# Patient Record
Sex: FEMALE | Race: WHITE | Employment: OTHER | URBAN - NONMETROPOLITAN AREA
[De-identification: names, ages, dates, MRNs, and addresses within clinical notes are randomized per-mention and may not be internally consistent; named-entity substitution may affect disease eponyms.]

---

## 2018-01-01 ENCOUNTER — APPOINTMENT (OUTPATIENT)
Dept: GENERAL RADIOLOGY | Age: 83
DRG: 283 | End: 2018-01-01
Payer: MEDICARE

## 2018-01-01 ENCOUNTER — HOSPITAL ENCOUNTER (INPATIENT)
Age: 83
LOS: 5 days | DRG: 283 | End: 2018-03-14
Attending: INTERNAL MEDICINE | Admitting: INTERNAL MEDICINE
Payer: MEDICARE

## 2018-01-01 VITALS
DIASTOLIC BLOOD PRESSURE: 61 MMHG | RESPIRATION RATE: 16 BRPM | HEART RATE: 73 BPM | BODY MASS INDEX: 22.78 KG/M2 | SYSTOLIC BLOOD PRESSURE: 100 MMHG | WEIGHT: 116 LBS | OXYGEN SATURATION: 97 % | HEIGHT: 60 IN | TEMPERATURE: 97.4 F

## 2018-01-01 DIAGNOSIS — R77.8 ELEVATED TROPONIN: ICD-10-CM

## 2018-01-01 DIAGNOSIS — R62.51 FAILURE TO THRIVE (0-17): Primary | ICD-10-CM

## 2018-01-01 LAB
ALBUMIN SERPL-MCNC: 3.1 G/DL (ref 3.5–5.1)
ALBUMIN SERPL-MCNC: 3.7 G/DL (ref 3.5–5.1)
ALLEN TEST: POSITIVE
ALLEN TEST: POSITIVE
ALP BLD-CCNC: 80 U/L (ref 38–126)
ALP BLD-CCNC: 93 U/L (ref 38–126)
ALT SERPL-CCNC: 25 U/L (ref 11–66)
ALT SERPL-CCNC: 32 U/L (ref 11–66)
AMORPHOUS: ABNORMAL
ANION GAP SERPL CALCULATED.3IONS-SCNC: 13 MEQ/L (ref 8–16)
ANION GAP SERPL CALCULATED.3IONS-SCNC: 14 MEQ/L (ref 8–16)
ANION GAP SERPL CALCULATED.3IONS-SCNC: 16 MEQ/L (ref 8–16)
ANION GAP SERPL CALCULATED.3IONS-SCNC: 17 MEQ/L (ref 8–16)
ANION GAP SERPL CALCULATED.3IONS-SCNC: 19 MEQ/L (ref 8–16)
ANION GAP SERPL CALCULATED.3IONS-SCNC: 21 MEQ/L (ref 8–16)
ANISOCYTOSIS: ABNORMAL
ANISOCYTOSIS: ABNORMAL
AST SERPL-CCNC: 27 U/L (ref 5–40)
AST SERPL-CCNC: 30 U/L (ref 5–40)
BACTERIA: ABNORMAL /HPF
BASE EXCESS (CALCULATED): -14.9 MMOL/L (ref -2.5–2.5)
BASE EXCESS (CALCULATED): -2.8 MMOL/L (ref -2.5–2.5)
BASOPHILS # BLD: 0.1 %
BASOPHILS # BLD: 1.8 %
BASOPHILS ABSOLUTE: 0 THOU/MM3 (ref 0–0.1)
BASOPHILS ABSOLUTE: 0.2 THOU/MM3 (ref 0–0.1)
BILIRUB SERPL-MCNC: 1.1 MG/DL (ref 0.3–1.2)
BILIRUB SERPL-MCNC: 1.2 MG/DL (ref 0.3–1.2)
BILIRUBIN DIRECT: 0.5 MG/DL (ref 0–0.3)
BILIRUBIN URINE: ABNORMAL
BLOOD, URINE: ABNORMAL
BUN BLDV-MCNC: 28 MG/DL (ref 7–22)
BUN BLDV-MCNC: 30 MG/DL (ref 7–22)
BUN BLDV-MCNC: 35 MG/DL (ref 7–22)
BUN BLDV-MCNC: 44 MG/DL (ref 7–22)
BUN BLDV-MCNC: 56 MG/DL (ref 7–22)
BUN BLDV-MCNC: 70 MG/DL (ref 7–22)
CALCIUM SERPL-MCNC: 8.7 MG/DL (ref 8.5–10.5)
CALCIUM SERPL-MCNC: 8.7 MG/DL (ref 8.5–10.5)
CALCIUM SERPL-MCNC: 8.9 MG/DL (ref 8.5–10.5)
CALCIUM SERPL-MCNC: 9 MG/DL (ref 8.5–10.5)
CALCIUM SERPL-MCNC: 9.1 MG/DL (ref 8.5–10.5)
CALCIUM SERPL-MCNC: 9.9 MG/DL (ref 8.5–10.5)
CASTS UA: ABNORMAL /LPF
CHARACTER, URINE: ABNORMAL
CHLORIDE BLD-SCNC: 92 MEQ/L (ref 98–111)
CHLORIDE BLD-SCNC: 92 MEQ/L (ref 98–111)
CHLORIDE BLD-SCNC: 93 MEQ/L (ref 98–111)
CHLORIDE BLD-SCNC: 94 MEQ/L (ref 98–111)
CHLORIDE BLD-SCNC: 95 MEQ/L (ref 98–111)
CHLORIDE BLD-SCNC: 96 MEQ/L (ref 98–111)
CHOLESTEROL, TOTAL: 194 MG/DL (ref 100–199)
CO2: 21 MEQ/L (ref 23–33)
CO2: 23 MEQ/L (ref 23–33)
CO2: 24 MEQ/L (ref 23–33)
CO2: 25 MEQ/L (ref 23–33)
CO2: 26 MEQ/L (ref 23–33)
CO2: 26 MEQ/L (ref 23–33)
COLLECTED BY:: ABNORMAL
COLLECTED BY:: ABNORMAL
COLOR: ABNORMAL
CREAT SERPL-MCNC: 0.6 MG/DL (ref 0.4–1.2)
CREAT SERPL-MCNC: 0.6 MG/DL (ref 0.4–1.2)
CREAT SERPL-MCNC: 0.7 MG/DL (ref 0.4–1.2)
CREAT SERPL-MCNC: 0.7 MG/DL (ref 0.4–1.2)
CREAT SERPL-MCNC: 0.8 MG/DL (ref 0.4–1.2)
CREAT SERPL-MCNC: 1.2 MG/DL (ref 0.4–1.2)
CRYSTALS, UA: ABNORMAL
DEVICE: ABNORMAL
DEVICE: ABNORMAL
DIGOXIN LEVEL: 1.8 NG/ML (ref 0.5–2)
EKG ATRIAL RATE: 119 BPM
EKG ATRIAL RATE: 121 BPM
EKG ATRIAL RATE: 129 BPM
EKG Q-T INTERVAL: 304 MS
EKG Q-T INTERVAL: 332 MS
EKG Q-T INTERVAL: 346 MS
EKG QRS DURATION: 100 MS
EKG QRS DURATION: 84 MS
EKG QRS DURATION: 90 MS
EKG QTC CALCULATION (BAZETT): 396 MS
EKG QTC CALCULATION (BAZETT): 441 MS
EKG QTC CALCULATION (BAZETT): 491 MS
EKG R AXIS: -33 DEGREES
EKG R AXIS: -50 DEGREES
EKG R AXIS: -9 DEGREES
EKG T AXIS: -148 DEGREES
EKG T AXIS: 112 DEGREES
EKG T AXIS: 154 DEGREES
EKG VENTRICULAR RATE: 102 BPM
EKG VENTRICULAR RATE: 106 BPM
EKG VENTRICULAR RATE: 121 BPM
EOSINOPHIL # BLD: 0.1 %
EOSINOPHIL # BLD: 0.6 %
EOSINOPHILS ABSOLUTE: 0 THOU/MM3 (ref 0–0.4)
EOSINOPHILS ABSOLUTE: 0.1 THOU/MM3 (ref 0–0.4)
EPITHELIAL CELLS, UA: ABNORMAL /HPF
FLU A ANTIGEN: NEGATIVE
FLU B ANTIGEN: NEGATIVE
GFR SERPL CREATININE-BSD FRML MDRD: 42 ML/MIN/1.73M2
GFR SERPL CREATININE-BSD FRML MDRD: 67 ML/MIN/1.73M2
GFR SERPL CREATININE-BSD FRML MDRD: 79 ML/MIN/1.73M2
GFR SERPL CREATININE-BSD FRML MDRD: 79 ML/MIN/1.73M2
GFR SERPL CREATININE-BSD FRML MDRD: > 90 ML/MIN/1.73M2
GFR SERPL CREATININE-BSD FRML MDRD: > 90 ML/MIN/1.73M2
GLUCOSE BLD-MCNC: 112 MG/DL (ref 70–108)
GLUCOSE BLD-MCNC: 122 MG/DL (ref 70–108)
GLUCOSE BLD-MCNC: 123 MG/DL (ref 70–108)
GLUCOSE BLD-MCNC: 123 MG/DL (ref 70–108)
GLUCOSE BLD-MCNC: 125 MG/DL (ref 70–108)
GLUCOSE BLD-MCNC: 138 MG/DL (ref 70–108)
GLUCOSE BLD-MCNC: 140 MG/DL (ref 70–108)
GLUCOSE BLD-MCNC: 143 MG/DL (ref 70–108)
GLUCOSE BLD-MCNC: 157 MG/DL (ref 70–108)
GLUCOSE BLD-MCNC: 158 MG/DL (ref 70–108)
GLUCOSE BLD-MCNC: 166 MG/DL (ref 70–108)
GLUCOSE BLD-MCNC: 173 MG/DL (ref 70–108)
GLUCOSE BLD-MCNC: 173 MG/DL (ref 70–108)
GLUCOSE BLD-MCNC: 179 MG/DL (ref 70–108)
GLUCOSE BLD-MCNC: 181 MG/DL (ref 70–108)
GLUCOSE BLD-MCNC: 184 MG/DL (ref 70–108)
GLUCOSE BLD-MCNC: 202 MG/DL (ref 70–108)
GLUCOSE BLD-MCNC: 204 MG/DL (ref 70–108)
GLUCOSE BLD-MCNC: 218 MG/DL (ref 70–108)
GLUCOSE BLD-MCNC: 218 MG/DL (ref 70–108)
GLUCOSE BLD-MCNC: 236 MG/DL (ref 70–108)
GLUCOSE BLD-MCNC: 244 MG/DL (ref 70–108)
GLUCOSE URINE: NEGATIVE MG/DL
HCO3: 20 MMOL/L (ref 23–28)
HCO3: 9 MMOL/L (ref 23–28)
HCT VFR BLD CALC: 39 % (ref 37–47)
HCT VFR BLD CALC: 45.8 % (ref 37–47)
HDLC SERPL-MCNC: 19 MG/DL
HEMOGLOBIN: 12.5 GM/DL (ref 12–16)
HEMOGLOBIN: 14.8 GM/DL (ref 12–16)
ICTOTEST: NEGATIVE
IFIO2: 21
IFIO2: 3
INR BLD: 1.55 (ref 0.85–1.13)
INR BLD: 2.41 (ref 0.85–1.13)
INR BLD: 2.42 (ref 0.85–1.13)
INR BLD: 3.09 (ref 0.85–1.13)
INR BLD: 7.7 (ref 0.85–1.13)
INR BLD: 7.98 (ref 0.85–1.13)
INR BLD: 8.23 (ref 0.85–1.13)
INR BLD: 8.52 (ref 0.85–1.13)
KETONES, URINE: 15
LDL CHOLESTEROL CALCULATED: 143 MG/DL
LEUKOCYTE ESTERASE, URINE: ABNORMAL
LV EF: 18 %
LVEF MODALITY: NORMAL
LYMPHOCYTES # BLD: 12 %
LYMPHOCYTES # BLD: 3.5 %
LYMPHOCYTES ABSOLUTE: 0.3 THOU/MM3 (ref 1–4.8)
LYMPHOCYTES ABSOLUTE: 1.2 THOU/MM3 (ref 1–4.8)
MAGNESIUM: 2.3 MG/DL (ref 1.6–2.4)
MCH RBC QN AUTO: 29.4 PG (ref 27–31)
MCH RBC QN AUTO: 29.5 PG (ref 27–31)
MCHC RBC AUTO-ENTMCNC: 32.1 GM/DL (ref 33–37)
MCHC RBC AUTO-ENTMCNC: 32.2 GM/DL (ref 33–37)
MCV RBC AUTO: 91.6 FL (ref 81–99)
MCV RBC AUTO: 91.7 FL (ref 81–99)
MONOCYTES # BLD: 3.1 %
MONOCYTES # BLD: 7.4 %
MONOCYTES ABSOLUTE: 0.3 THOU/MM3 (ref 0.4–1.3)
MONOCYTES ABSOLUTE: 0.7 THOU/MM3 (ref 0.4–1.3)
MUCUS: ABNORMAL
NITRITE, URINE: NEGATIVE
NUCLEATED RED BLOOD CELLS: 0 /100 WBC
NUCLEATED RED BLOOD CELLS: 0 /100 WBC
O2 SATURATION: 97 %
O2 SATURATION: 99 %
ORGANISM: ABNORMAL
OSMOLALITY CALCULATION: 270.4 MOSMOL/KG (ref 275–300)
OSMOLALITY CALCULATION: 278.6 MOSMOL/KG (ref 275–300)
PCO2: 19 MMHG (ref 35–45)
PCO2: 29 MMHG (ref 35–45)
PDW BLD-RTO: 16.1 % (ref 11.5–14.5)
PDW BLD-RTO: 16.4 % (ref 11.5–14.5)
PH BLOOD GAS: 7.29 (ref 7.35–7.45)
PH BLOOD GAS: 7.45 (ref 7.35–7.45)
PH UA: 5.5
PLATELET # BLD: 260 THOU/MM3 (ref 130–400)
PLATELET # BLD: 291 THOU/MM3 (ref 130–400)
PMV BLD AUTO: 8.8 FL (ref 7.4–10.4)
PMV BLD AUTO: 9.1 FL (ref 7.4–10.4)
PO2: 142 MMHG (ref 71–104)
PO2: 88 MMHG (ref 71–104)
POTASSIUM SERPL-SCNC: 3.6 MEQ/L (ref 3.5–5.2)
POTASSIUM SERPL-SCNC: 3.7 MEQ/L (ref 3.5–5.2)
POTASSIUM SERPL-SCNC: 3.8 MEQ/L (ref 3.5–5.2)
POTASSIUM SERPL-SCNC: 4.2 MEQ/L (ref 3.5–5.2)
POTASSIUM SERPL-SCNC: 4.2 MEQ/L (ref 3.5–5.2)
POTASSIUM SERPL-SCNC: 4.6 MEQ/L (ref 3.5–5.2)
PRO-BNP: ABNORMAL PG/ML (ref 0–1800)
PROTEIN UA: 30
RBC # BLD: 4.26 MILL/MM3 (ref 4.2–5.4)
RBC # BLD: 5.01 MILL/MM3 (ref 4.2–5.4)
RBC URINE: ABNORMAL /HPF
SEG NEUTROPHILS: 84.2 %
SEG NEUTROPHILS: 87.2 %
SEGMENTED NEUTROPHILS ABSOLUTE COUNT: 8.1 THOU/MM3 (ref 1.8–7.7)
SEGMENTED NEUTROPHILS ABSOLUTE COUNT: 8.3 THOU/MM3 (ref 1.8–7.7)
SODIUM BLD-SCNC: 131 MEQ/L (ref 135–145)
SODIUM BLD-SCNC: 134 MEQ/L (ref 135–145)
SODIUM BLD-SCNC: 135 MEQ/L (ref 135–145)
SODIUM BLD-SCNC: 139 MEQ/L (ref 135–145)
SOURCE, BLOOD GAS: ABNORMAL
SOURCE, BLOOD GAS: ABNORMAL
SPECIFIC GRAVITY, URINE: 1.02 (ref 1–1.03)
T4 FREE: 1.36 NG/DL (ref 0.93–1.76)
TOTAL CK: 121 U/L (ref 30–135)
TOTAL CK: 152 U/L (ref 30–135)
TOTAL PROTEIN: 5.8 G/DL (ref 6.1–8)
TOTAL PROTEIN: 6.4 G/DL (ref 6.1–8)
TRIGL SERPL-MCNC: 162 MG/DL (ref 0–199)
TROPONIN T: 1.54 NG/ML
TROPONIN T: 1.75 NG/ML
TROPONIN T: 2.06 NG/ML
TSH SERPL DL<=0.05 MIU/L-ACNC: 2.81 UIU/ML (ref 0.4–4.2)
URINE CULTURE REFLEX: ABNORMAL
UROBILINOGEN, URINE: 1 EU/DL
WBC # BLD: 9.3 THOU/MM3 (ref 4.8–10.8)
WBC # BLD: 9.9 THOU/MM3 (ref 4.8–10.8)
WBC UA: ABNORMAL /HPF

## 2018-01-01 PROCEDURE — 6370000000 HC RX 637 (ALT 250 FOR IP): Performed by: PHYSICIAN ASSISTANT

## 2018-01-01 PROCEDURE — 2140000000 HC CCU INTERMEDIATE R&B

## 2018-01-01 PROCEDURE — 6370000000 HC RX 637 (ALT 250 FOR IP): Performed by: INTERNAL MEDICINE

## 2018-01-01 PROCEDURE — 82948 REAGENT STRIP/BLOOD GLUCOSE: CPT

## 2018-01-01 PROCEDURE — 99232 SBSQ HOSP IP/OBS MODERATE 35: CPT | Performed by: PHYSICIAN ASSISTANT

## 2018-01-01 PROCEDURE — 80053 COMPREHEN METABOLIC PANEL: CPT

## 2018-01-01 PROCEDURE — 36415 COLL VENOUS BLD VENIPUNCTURE: CPT

## 2018-01-01 PROCEDURE — 84439 ASSAY OF FREE THYROXINE: CPT

## 2018-01-01 PROCEDURE — 99223 1ST HOSP IP/OBS HIGH 75: CPT | Performed by: INTERNAL MEDICINE

## 2018-01-01 PROCEDURE — 85610 PROTHROMBIN TIME: CPT

## 2018-01-01 PROCEDURE — 84484 ASSAY OF TROPONIN QUANT: CPT

## 2018-01-01 PROCEDURE — 93005 ELECTROCARDIOGRAM TRACING: CPT | Performed by: INTERNAL MEDICINE

## 2018-01-01 PROCEDURE — 83880 ASSAY OF NATRIURETIC PEPTIDE: CPT

## 2018-01-01 PROCEDURE — 71046 X-RAY EXAM CHEST 2 VIEWS: CPT

## 2018-01-01 PROCEDURE — 80048 BASIC METABOLIC PNL TOTAL CA: CPT

## 2018-01-01 PROCEDURE — 94640 AIRWAY INHALATION TREATMENT: CPT

## 2018-01-01 PROCEDURE — 93306 TTE W/DOPPLER COMPLETE: CPT

## 2018-01-01 PROCEDURE — 87804 INFLUENZA ASSAY W/OPTIC: CPT

## 2018-01-01 PROCEDURE — 82550 ASSAY OF CK (CPK): CPT

## 2018-01-01 PROCEDURE — 36600 WITHDRAWAL OF ARTERIAL BLOOD: CPT

## 2018-01-01 PROCEDURE — 87086 URINE CULTURE/COLONY COUNT: CPT

## 2018-01-01 PROCEDURE — 93005 ELECTROCARDIOGRAM TRACING: CPT

## 2018-01-01 PROCEDURE — 82803 BLOOD GASES ANY COMBINATION: CPT

## 2018-01-01 PROCEDURE — 83735 ASSAY OF MAGNESIUM: CPT

## 2018-01-01 PROCEDURE — 2500000003 HC RX 250 WO HCPCS: Performed by: INTERNAL MEDICINE

## 2018-01-01 PROCEDURE — 2580000003 HC RX 258: Performed by: INTERNAL MEDICINE

## 2018-01-01 PROCEDURE — 81001 URINALYSIS AUTO W/SCOPE: CPT

## 2018-01-01 PROCEDURE — 6360000002 HC RX W HCPCS: Performed by: INTERNAL MEDICINE

## 2018-01-01 PROCEDURE — 99285 EMERGENCY DEPT VISIT HI MDM: CPT

## 2018-01-01 PROCEDURE — 80061 LIPID PANEL: CPT

## 2018-01-01 PROCEDURE — 82248 BILIRUBIN DIRECT: CPT

## 2018-01-01 PROCEDURE — 80162 ASSAY OF DIGOXIN TOTAL: CPT

## 2018-01-01 PROCEDURE — 85025 COMPLETE CBC W/AUTO DIFF WBC: CPT

## 2018-01-01 PROCEDURE — 87077 CULTURE AEROBIC IDENTIFY: CPT

## 2018-01-01 PROCEDURE — 84443 ASSAY THYROID STIM HORMONE: CPT

## 2018-01-01 PROCEDURE — 93010 ELECTROCARDIOGRAM REPORT: CPT | Performed by: INTERNAL MEDICINE

## 2018-01-01 RX ORDER — FUROSEMIDE 20 MG/1
20 TABLET ORAL 2 TIMES DAILY
Status: DISCONTINUED | OUTPATIENT
Start: 2018-01-01 | End: 2018-01-01 | Stop reason: HOSPADM

## 2018-01-01 RX ORDER — FUROSEMIDE 20 MG/1
20 TABLET ORAL DAILY
Status: DISCONTINUED | OUTPATIENT
Start: 2018-01-01 | End: 2018-01-01

## 2018-01-01 RX ORDER — PANTOPRAZOLE SODIUM 40 MG/1
40 TABLET, DELAYED RELEASE ORAL
Status: DISCONTINUED | OUTPATIENT
Start: 2018-01-01 | End: 2018-01-01 | Stop reason: HOSPADM

## 2018-01-01 RX ORDER — MEMANTINE HYDROCHLORIDE 5 MG/1
5 TABLET ORAL 2 TIMES DAILY
Status: DISCONTINUED | OUTPATIENT
Start: 2018-01-01 | End: 2018-01-01 | Stop reason: HOSPADM

## 2018-01-01 RX ORDER — DEXTROSE MONOHYDRATE 25 G/50ML
12.5 INJECTION, SOLUTION INTRAVENOUS PRN
Status: DISCONTINUED | OUTPATIENT
Start: 2018-01-01 | End: 2018-01-01 | Stop reason: HOSPADM

## 2018-01-01 RX ORDER — IPRATROPIUM BROMIDE AND ALBUTEROL SULFATE 2.5; .5 MG/3ML; MG/3ML
1 SOLUTION RESPIRATORY (INHALATION) ONCE
Status: COMPLETED | OUTPATIENT
Start: 2018-01-01 | End: 2018-01-01

## 2018-01-01 RX ORDER — WARFARIN SODIUM 2 MG/1
2 TABLET ORAL
Status: DISCONTINUED | OUTPATIENT
Start: 2018-01-01 | End: 2018-01-01 | Stop reason: HOSPADM

## 2018-01-01 RX ORDER — ATORVASTATIN CALCIUM 20 MG/1
20 TABLET, FILM COATED ORAL NIGHTLY
Status: DISCONTINUED | OUTPATIENT
Start: 2018-01-01 | End: 2018-01-01 | Stop reason: HOSPADM

## 2018-01-01 RX ORDER — DOCUSATE SODIUM 100 MG/1
100 CAPSULE, LIQUID FILLED ORAL 2 TIMES DAILY
Status: DISCONTINUED | OUTPATIENT
Start: 2018-01-01 | End: 2018-01-01 | Stop reason: HOSPADM

## 2018-01-01 RX ORDER — PHYTONADIONE 10 MG/ML
2.5 INJECTION, EMULSION INTRAMUSCULAR; INTRAVENOUS; SUBCUTANEOUS ONCE
Status: DISCONTINUED | OUTPATIENT
Start: 2018-01-01 | End: 2018-01-01 | Stop reason: ALTCHOICE

## 2018-01-01 RX ORDER — LISINOPRIL 5 MG/1
5 TABLET ORAL DAILY
Status: DISCONTINUED | OUTPATIENT
Start: 2018-01-01 | End: 2018-01-01 | Stop reason: HOSPADM

## 2018-01-01 RX ORDER — PHYTONADIONE 5 MG/1
2.5 TABLET ORAL ONCE
Status: COMPLETED | OUTPATIENT
Start: 2018-01-01 | End: 2018-01-01

## 2018-01-01 RX ORDER — BUMETANIDE 0.25 MG/ML
0.5 INJECTION, SOLUTION INTRAMUSCULAR; INTRAVENOUS EVERY 8 HOURS
Status: DISCONTINUED | OUTPATIENT
Start: 2018-01-01 | End: 2018-01-01 | Stop reason: CLARIF

## 2018-01-01 RX ORDER — MEMANTINE HYDROCHLORIDE 5 MG/1
5 TABLET ORAL 2 TIMES DAILY
COMMUNITY

## 2018-01-01 RX ORDER — POLYETHYLENE GLYCOL 3350 17 G/17G
17 POWDER, FOR SOLUTION ORAL DAILY PRN
Status: DISCONTINUED | OUTPATIENT
Start: 2018-01-01 | End: 2018-01-01 | Stop reason: HOSPADM

## 2018-01-01 RX ORDER — DEXTROSE MONOHYDRATE 50 MG/ML
100 INJECTION, SOLUTION INTRAVENOUS PRN
Status: DISCONTINUED | OUTPATIENT
Start: 2018-01-01 | End: 2018-01-01 | Stop reason: HOSPADM

## 2018-01-01 RX ORDER — ALPRAZOLAM 0.25 MG/1
0.25 TABLET ORAL EVERY 12 HOURS PRN
Status: DISCONTINUED | OUTPATIENT
Start: 2018-01-01 | End: 2018-01-01 | Stop reason: HOSPADM

## 2018-01-01 RX ORDER — ASPIRIN 81 MG/1
81 TABLET, CHEWABLE ORAL DAILY
Status: DISCONTINUED | OUTPATIENT
Start: 2018-01-01 | End: 2018-01-01 | Stop reason: HOSPADM

## 2018-01-01 RX ORDER — DIGOXIN 125 MCG
125 TABLET ORAL DAILY
Status: DISCONTINUED | OUTPATIENT
Start: 2018-01-01 | End: 2018-01-01 | Stop reason: HOSPADM

## 2018-01-01 RX ORDER — IPRATROPIUM BROMIDE AND ALBUTEROL SULFATE 2.5; .5 MG/3ML; MG/3ML
1 SOLUTION RESPIRATORY (INHALATION) EVERY 4 HOURS PRN
Status: DISCONTINUED | OUTPATIENT
Start: 2018-01-01 | End: 2018-01-01 | Stop reason: HOSPADM

## 2018-01-01 RX ORDER — METOPROLOL SUCCINATE 50 MG/1
50 TABLET, EXTENDED RELEASE ORAL DAILY
Status: DISCONTINUED | OUTPATIENT
Start: 2018-01-01 | End: 2018-01-01 | Stop reason: HOSPADM

## 2018-01-01 RX ORDER — NICOTINE POLACRILEX 4 MG
15 LOZENGE BUCCAL PRN
Status: DISCONTINUED | OUTPATIENT
Start: 2018-01-01 | End: 2018-01-01 | Stop reason: HOSPADM

## 2018-01-01 RX ORDER — ACETAMINOPHEN 325 MG/1
650 TABLET ORAL EVERY 4 HOURS PRN
Status: DISCONTINUED | OUTPATIENT
Start: 2018-01-01 | End: 2018-01-01 | Stop reason: HOSPADM

## 2018-01-01 RX ORDER — FUROSEMIDE 10 MG/ML
20 INJECTION INTRAMUSCULAR; INTRAVENOUS EVERY 8 HOURS
Status: COMPLETED | OUTPATIENT
Start: 2018-01-01 | End: 2018-01-01

## 2018-01-01 RX ADMIN — ACETAMINOPHEN 650 MG: 325 TABLET ORAL at 20:48

## 2018-01-01 RX ADMIN — ASPIRIN 81 MG: 81 TABLET, CHEWABLE ORAL at 07:58

## 2018-01-01 RX ADMIN — MEMANTINE HYDROCHLORIDE 5 MG: 5 TABLET, FILM COATED ORAL at 20:49

## 2018-01-01 RX ADMIN — MEMANTINE HYDROCHLORIDE 5 MG: 5 TABLET, FILM COATED ORAL at 09:24

## 2018-01-01 RX ADMIN — Medication 4 UNITS: at 17:29

## 2018-01-01 RX ADMIN — MEMANTINE HYDROCHLORIDE 5 MG: 5 TABLET, FILM COATED ORAL at 19:59

## 2018-01-01 RX ADMIN — ACETAMINOPHEN 650 MG: 325 TABLET ORAL at 22:44

## 2018-01-01 RX ADMIN — ASPIRIN 81 MG: 81 TABLET, CHEWABLE ORAL at 08:29

## 2018-01-01 RX ADMIN — Medication 2 UNITS: at 12:04

## 2018-01-01 RX ADMIN — FUROSEMIDE 20 MG: 20 TABLET ORAL at 09:20

## 2018-01-01 RX ADMIN — INSULIN LISPRO 1 UNITS: 100 INJECTION, SOLUTION INTRAVENOUS; SUBCUTANEOUS at 20:50

## 2018-01-01 RX ADMIN — FUROSEMIDE 20 MG: 20 TABLET ORAL at 10:07

## 2018-01-01 RX ADMIN — DIGOXIN 125 MCG: 0.12 TABLET ORAL at 09:20

## 2018-01-01 RX ADMIN — ATORVASTATIN CALCIUM 20 MG: 20 TABLET, FILM COATED ORAL at 19:59

## 2018-01-01 RX ADMIN — ACETAMINOPHEN 650 MG: 325 TABLET ORAL at 20:08

## 2018-01-01 RX ADMIN — ALPRAZOLAM 0.25 MG: 0.25 TABLET ORAL at 10:50

## 2018-01-01 RX ADMIN — PHYTONADIONE 2.5 MG: 10 INJECTION, EMULSION INTRAMUSCULAR; INTRAVENOUS; SUBCUTANEOUS at 14:03

## 2018-01-01 RX ADMIN — ACETAMINOPHEN 650 MG: 325 TABLET ORAL at 22:14

## 2018-01-01 RX ADMIN — DOCUSATE SODIUM 100 MG: 100 CAPSULE, LIQUID FILLED ORAL at 21:11

## 2018-01-01 RX ADMIN — FUROSEMIDE 20 MG: 20 TABLET ORAL at 08:29

## 2018-01-01 RX ADMIN — DIGOXIN 125 MCG: 0.12 TABLET ORAL at 09:24

## 2018-01-01 RX ADMIN — DOCUSATE SODIUM 100 MG: 100 CAPSULE, LIQUID FILLED ORAL at 10:08

## 2018-01-01 RX ADMIN — METFORMIN HYDROCHLORIDE 500 MG: 500 TABLET ORAL at 08:00

## 2018-01-01 RX ADMIN — DIGOXIN 125 MCG: 0.12 TABLET ORAL at 08:29

## 2018-01-01 RX ADMIN — AMIODARONE HYDROCHLORIDE 0.5 MG/MIN: 1.8 INJECTION, SOLUTION INTRAVENOUS at 21:21

## 2018-01-01 RX ADMIN — PANTOPRAZOLE SODIUM 40 MG: 40 TABLET, DELAYED RELEASE ORAL at 04:19

## 2018-01-01 RX ADMIN — ASPIRIN 81 MG: 81 TABLET, CHEWABLE ORAL at 10:07

## 2018-01-01 RX ADMIN — DIGOXIN 125 MCG: 0.12 TABLET ORAL at 10:07

## 2018-01-01 RX ADMIN — MEMANTINE HYDROCHLORIDE 5 MG: 5 TABLET, FILM COATED ORAL at 21:21

## 2018-01-01 RX ADMIN — MEMANTINE HYDROCHLORIDE 5 MG: 5 TABLET, FILM COATED ORAL at 08:28

## 2018-01-01 RX ADMIN — FUROSEMIDE 20 MG: 10 INJECTION, SOLUTION INTRAMUSCULAR; INTRAVENOUS at 14:18

## 2018-01-01 RX ADMIN — DOCUSATE SODIUM 100 MG: 100 CAPSULE, LIQUID FILLED ORAL at 08:35

## 2018-01-01 RX ADMIN — Medication 4 UNITS: at 12:34

## 2018-01-01 RX ADMIN — PANTOPRAZOLE SODIUM 40 MG: 40 TABLET, DELAYED RELEASE ORAL at 08:00

## 2018-01-01 RX ADMIN — PHYTONADIONE 2.5 MG: 5 TABLET ORAL at 23:01

## 2018-01-01 RX ADMIN — ASPIRIN 81 MG: 81 TABLET, CHEWABLE ORAL at 09:21

## 2018-01-01 RX ADMIN — METOPROLOL SUCCINATE 50 MG: 50 TABLET, FILM COATED, EXTENDED RELEASE ORAL at 12:26

## 2018-01-01 RX ADMIN — METOPROLOL SUCCINATE 50 MG: 50 TABLET, FILM COATED, EXTENDED RELEASE ORAL at 07:59

## 2018-01-01 RX ADMIN — ATORVASTATIN CALCIUM 20 MG: 20 TABLET, FILM COATED ORAL at 21:21

## 2018-01-01 RX ADMIN — DOCUSATE SODIUM 100 MG: 100 CAPSULE, LIQUID FILLED ORAL at 09:24

## 2018-01-01 RX ADMIN — INSULIN LISPRO 2 UNITS: 100 INJECTION, SOLUTION INTRAVENOUS; SUBCUTANEOUS at 20:09

## 2018-01-01 RX ADMIN — MEMANTINE HYDROCHLORIDE 5 MG: 5 TABLET, FILM COATED ORAL at 09:20

## 2018-01-01 RX ADMIN — MEMANTINE HYDROCHLORIDE 5 MG: 5 TABLET, FILM COATED ORAL at 10:07

## 2018-01-01 RX ADMIN — METOPROLOL SUCCINATE 50 MG: 50 TABLET, FILM COATED, EXTENDED RELEASE ORAL at 09:20

## 2018-01-01 RX ADMIN — AMIODARONE HYDROCHLORIDE 150 MG: 1.5 INJECTION, SOLUTION INTRAVENOUS at 13:59

## 2018-01-01 RX ADMIN — FUROSEMIDE 20 MG: 20 TABLET ORAL at 20:08

## 2018-01-01 RX ADMIN — DOCUSATE SODIUM 100 MG: 100 CAPSULE, LIQUID FILLED ORAL at 20:49

## 2018-01-01 RX ADMIN — ASPIRIN 81 MG: 81 TABLET, CHEWABLE ORAL at 09:24

## 2018-01-01 RX ADMIN — ASPIRIN 81 MG: 81 TABLET, CHEWABLE ORAL at 22:27

## 2018-01-01 RX ADMIN — DOCUSATE SODIUM 100 MG: 100 CAPSULE, LIQUID FILLED ORAL at 21:40

## 2018-01-01 RX ADMIN — FUROSEMIDE 20 MG: 20 TABLET ORAL at 17:59

## 2018-01-01 RX ADMIN — MEMANTINE HYDROCHLORIDE 5 MG: 5 TABLET, FILM COATED ORAL at 08:16

## 2018-01-01 RX ADMIN — FUROSEMIDE 20 MG: 10 INJECTION, SOLUTION INTRAMUSCULAR; INTRAVENOUS at 08:01

## 2018-01-01 RX ADMIN — MEMANTINE HYDROCHLORIDE 5 MG: 5 TABLET, FILM COATED ORAL at 21:40

## 2018-01-01 RX ADMIN — FUROSEMIDE 20 MG: 10 INJECTION, SOLUTION INTRAMUSCULAR; INTRAVENOUS at 23:01

## 2018-01-01 RX ADMIN — DOCUSATE SODIUM 100 MG: 100 CAPSULE, LIQUID FILLED ORAL at 19:59

## 2018-01-01 RX ADMIN — PANTOPRAZOLE SODIUM 40 MG: 40 TABLET, DELAYED RELEASE ORAL at 08:28

## 2018-01-01 RX ADMIN — DOCUSATE SODIUM 100 MG: 100 CAPSULE, LIQUID FILLED ORAL at 08:16

## 2018-01-01 RX ADMIN — MEMANTINE HYDROCHLORIDE 5 MG: 5 TABLET, FILM COATED ORAL at 21:11

## 2018-01-01 RX ADMIN — PANTOPRAZOLE SODIUM 40 MG: 40 TABLET, DELAYED RELEASE ORAL at 09:24

## 2018-01-01 RX ADMIN — FUROSEMIDE 20 MG: 20 TABLET ORAL at 09:24

## 2018-01-01 RX ADMIN — PHYTONADIONE 2.5 MG: 10 INJECTION, EMULSION INTRAMUSCULAR; INTRAVENOUS; SUBCUTANEOUS at 08:33

## 2018-01-01 RX ADMIN — AMIODARONE HYDROCHLORIDE 1 MG/MIN: 1.8 INJECTION, SOLUTION INTRAVENOUS at 14:26

## 2018-01-01 RX ADMIN — IPRATROPIUM BROMIDE AND ALBUTEROL SULFATE 1 AMPULE: .5; 3 SOLUTION RESPIRATORY (INHALATION) at 13:14

## 2018-01-01 RX ADMIN — DIGOXIN 125 MCG: 0.12 TABLET ORAL at 08:01

## 2018-01-01 RX ADMIN — PANTOPRAZOLE SODIUM 40 MG: 40 TABLET, DELAYED RELEASE ORAL at 05:10

## 2018-01-01 RX ADMIN — Medication 2 UNITS: at 16:17

## 2018-01-01 ASSESSMENT — PAIN SCALES - GENERAL
PAINLEVEL_OUTOF10: 1
PAINLEVEL_OUTOF10: 0

## 2018-01-01 ASSESSMENT — ENCOUNTER SYMPTOMS
ABDOMINAL PAIN: 0
SORE THROAT: 0
VOMITING: 0
RHINORRHEA: 0
BACK PAIN: 0
SHORTNESS OF BREATH: 1
DIARRHEA: 0
NAUSEA: 0
EYE DISCHARGE: 0
WHEEZING: 0
EYE PAIN: 0
COUGH: 1

## 2018-01-01 ASSESSMENT — PAIN DESCRIPTION - ORIENTATION: ORIENTATION: LEFT

## 2018-01-01 ASSESSMENT — PAIN DESCRIPTION - LOCATION: LOCATION: ANKLE

## 2018-01-01 ASSESSMENT — PAIN DESCRIPTION - PAIN TYPE: TYPE: ACUTE PAIN

## 2018-03-09 PROBLEM — R77.8 ELEVATED TROPONIN: Status: ACTIVE | Noted: 2018-01-01

## 2018-03-09 NOTE — ED PROVIDER NOTES
[facility]  eMERGENCY dEPARTMENT eNCOUnter          CHIEF COMPLAINT       Chief Complaint   Patient presents with    Shortness of Breath    Weight Loss       Nurses Notes reviewed and I agree except as noted in the HPI. HISTORY OF PRESENT ILLNESS    Jessi Olea is a 80 y.o. female who presents to the Emergency Department for the evaluation of shortness of breath with onset initially occurring on Monday. Patient's son reports that the patient had an appointment with her pcp when she first presented with shortness of breath. He states that the patient's shortness of breath resolved and became recurrent this morning. Patient reports to be experiencing cough and decreased appetite secondary to her shortness of breath. Patient has reportedly lost 30 pounds since being diagnosed with the flu in January 2018. She denies fever, chills, dizziness, nausea, vomiting, diaphoresis, chest pain, heart palpitations. Patient states to be ambulatory with a walker. Patient reports states to have 2 cardiac stents with the most recent placement occurring 15 years ago. Her cardiologist is Dr. Walt Zhang. Patient reports to have a history of smoking but reports to quit 60 years ago. No additional symptoms or complaints at this time. The HPI was provided by the patient and her son. REVIEW OF SYSTEMS     Review of Systems   Constitutional: Positive for appetite change (decreased) and unexpected weight change (30 pound lost within the past two months). Negative for chills, fatigue and fever. HENT: Negative for congestion, ear pain, rhinorrhea and sore throat. Eyes: Negative for pain, discharge and visual disturbance. Respiratory: Positive for cough and shortness of breath. Negative for wheezing. Cardiovascular: Negative for chest pain, palpitations and leg swelling. Gastrointestinal: Negative for abdominal pain, diarrhea, nausea and vomiting.    Genitourinary: Negative for difficulty urinating, dysuria . She indicated that her paternal uncle is . family history includes Heart Disease in her father and mother. SOCIAL HISTORY      reports that she has quit smoking. She does not have any smokeless tobacco history on file. She reports that she does not drink alcohol or use drugs. PHYSICAL EXAM     INITIAL VITALS:  height is 5' (1.524 m) and weight is 109 lb (49.4 kg). Her oral temperature is 97.5 °F (36.4 °C). Her blood pressure is 116/71 and her pulse is 78. Her respiration is 20 and oxygen saturation is 98%. Physical Exam   Constitutional: She is oriented to person, place, and time. She appears well-developed and well-nourished. HENT:   Head: Normocephalic and atraumatic. Right Ear: External ear normal.   Left Ear: External ear normal.   Eyes: Conjunctivae are normal. Right eye exhibits no discharge. Left eye exhibits no discharge. No scleral icterus. Neck: Normal range of motion. Neck supple. No JVD present. Cardiovascular: Normal rate and normal heart sounds. An irregularly irregular rhythm present. Exam reveals no gallop and no friction rub. No murmur heard. Pulmonary/Chest: Effort normal. No respiratory distress. She has no decreased breath sounds. She has no wheezes. She has rhonchi (bilateral). She has no rales. Abdominal: Soft. She exhibits no distension. There is no tenderness. There is no rebound and no guarding. Musculoskeletal: Normal range of motion. She exhibits no edema (no lower extremity edema). Neurological: She is alert and oriented to person, place, and time. She exhibits normal muscle tone. She displays no seizure activity. GCS eye subscore is 4. GCS verbal subscore is 5. GCS motor subscore is 6. Skin: Skin is warm and dry. No rash noted. She is not diaphoretic. Psychiatric: She has a normal mood and affect. Her behavior is normal. Thought content normal.   Nursing note and vitals reviewed.       DIFFERENTIAL DIAGNOSIS:     DIAGNOSTIC RESULTS Notable for the following:     MCHC 32.2 (*)     RDW 16.4 (*)     Segs Absolute 8.1 (*)     Lymphocytes # 0.3 (*)     Basophils # 0.2 (*)     All other components within normal limits   URINE WITH REFLEXED MICRO - Abnormal; Notable for the following:     Bilirubin Urine MODERATE (*)     Ketones, Urine 15 (*)     Blood, Urine TRACE (*)     Protein, UA 30 (*)     Leukocyte Esterase, Urine SMALL (*)     Color, UA DK YELLOW (*)     Character, Urine CLOUDY (*)     All other components within normal limits   ANION GAP - Abnormal; Notable for the following: Anion Gap 17.0 (*)     All other components within normal limits   POCT GLUCOSE - Abnormal; Notable for the following:     POC Glucose 179 (*)     All other components within normal limits   RAPID INFLUENZA A/B ANTIGENS   URINE CULTURE, REFLEXED    Narrative:     Source: urine       Site: clean void          Current Antibiotics: none   BILE ACIDS, TOTAL   GLOMERULAR FILTRATION RATE, ESTIMATED   OSMOLALITY       EMERGENCY DEPARTMENT COURSE:   Vitals:    Vitals:    03/09/18 1203 03/09/18 1320 03/09/18 1424   BP: (!) 127/90 124/77 116/71   Pulse: 108 97 78   Resp: 24 20 20   Temp: 97.5 °F (36.4 °C)     TempSrc: Oral     SpO2: 100% 94% 98%   Weight: 109 lb (49.4 kg)     Height: 5' (1.524 m)         12:39 PM: The patient was seen and evaluated. MDM:  Patient was doing 100% on room air without any tachypnea. Patient seems to be completely asymptomatic other than the fact that patient seems to be emaciated because of losing 30 pounds recently. Patient has been eating less since her influenza in January of this year. Patient troponin was elevated to 1.7, proBNP was 27,555. Patient's other labs are also reviewed. Patient's chest x-ray showed blunting of the posterior costophrenic phrenic angles may be secondary to chronic pleural thickening or pleural effusion.  Patient EKG did showed some ST segment elevation that EKG was shared with Dr. Terrie Mcdermott, and he did not think that

## 2018-03-09 NOTE — ED NOTES
Pt resting on cart with family at bedside. Denies needs at this time. Call light in reach. Resp easy, unlabored. Breathing tx completed.       Jas Avery, [facility]  03/09/18 1321

## 2018-03-09 NOTE — ED NOTES
RT at bedside.       [facility]  03/09/18 [id number]

## 2018-03-10 NOTE — PLAN OF CARE
Problem: Falls - Risk of  Goal: Absence of falls  Outcome: Ongoing  Hourly rounding continues. Bed and chair alarm activated for safety. Problem: Discharge Planning:  Goal: Discharged to appropriate level of care  Discharged to appropriate level of care   Outcome: Ongoing  Patient lives at home with son, will assess for discharge needs. Problem: Cardiac Output - Decreased:  Goal: Hemodynamic stability will improve  Hemodynamic stability will improve   Outcome: Ongoing  Vitals stable, will continue to assess. Problem: Tissue Perfusion - Cardiopulmonary, Altered:  Goal: Absence of angina  Absence of angina   Outcome: Ongoing  Patient denies chest pain, has shortness of breath with activity. Goal: Hemodynamic stability will improve  Hemodynamic stability will improve   Outcome: Ongoing  Vitals stable, will continue to assess. Comments: Care plan reviewed with patient and family. Patient and famliy verbalize understanding of the plan of care and contribute to goal setting.

## 2018-03-10 NOTE — CONSULTS
mg  50 mg Oral Daily Bertram Muñoz MD   50 mg at 03/10/18 0759    polyethylene glycol (GLYCOLAX) packet 17 g  17 g Oral Daily PRN Bertram Muñoz MD        pantoprazole (PROTONIX) tablet 40 mg  40 mg Oral QAM AC Bertram Muñoz MD   40 mg at 03/10/18 0800    ipratropium-albuterol (DUONEB) nebulizer solution 1 ampule  1 ampule Inhalation Q4H PRN Bertram Muñoz MD        aspirin chewable tablet 81 mg  81 mg Oral Daily Bertram Muñoz MD   81 mg at 03/10/18 0758    warfarin (COUMADIN) daily dosing (placeholder)   Other RX Jamie Gama MD           Allergies:  Patient has no known allergies. Social History:    TOBACCO:   reports that she has quit smoking. She does not have any smokeless tobacco history on file. ETOH:   reports that she does not drink alcohol. Family History:        Problem Relation Age of Onset    Heart Disease Mother     Heart Disease Father          Review of Systems -   General ROS: negative  Psychological ROS: negative  Hematological and Lymphatic ROS: No history of blood clots or bleeding disorder. Respiratory ROS: no cough, shortness of breath, or wheezing  Cardiovascular ROS: no chest pain or dyspnea on exertion  Gastrointestinal ROS: negative  Genito-Urinary ROS: no dysuria, trouble voiding, or hematuria  Musculoskeletal ROS: negative  Neurological ROS: no TIA or stroke symptoms  Dermatological ROS: negative    All others reviewed and are negative.        /69   Pulse 105   Temp 97.9 °F (36.6 °C) (Oral)   Resp 20   Ht 5' (1.524 m)   Wt 115 lb 4.8 oz (52.3 kg)   SpO2 98%   BMI 22.52 kg/m²       Physical Examination:   General appearance - alert, well appearing, and in no distress  Mental status - alert, oriented to person, place, and time  Neck - supple, no significant adenopathy, no JVD, or carotid bruits  Chest - clear to auscultation, no wheezes, rales or rhonchi, symmetric air entry  Heart - normal rate,

## 2018-03-10 NOTE — PLAN OF CARE
Problem: Falls - Risk of  Goal: Absence of falls  Outcome: Ongoing  Hourly rounding continues. Bed alarm activated. Call light within reach.

## 2018-03-10 NOTE — FLOWSHEET NOTE
Informed Dr. Clementina Vo that the patient's blood sugar was 218 without sliding scale. She stated to add group 2 sliding scale. Orders placed in Epic.

## 2018-03-11 NOTE — PLAN OF CARE
Problem: Falls - Risk of  Goal: Absence of falls  Outcome: Ongoing  Assessment & interventions provided throughout shift. Bed locked & in low position, call light in reach, side-rails up x2, non-slip socks on when ambulating, reminded patient to use call light to call for assistance. Bed alarm on. Daughter at bedside. Problem: Discharge Planning:  Goal: Discharged to appropriate level of care  Discharged to appropriate level of care   Outcome: Ongoing      Problem: Cardiac Output - Decreased:  Goal: Hemodynamic stability will improve  Hemodynamic stability will improve   Outcome: Ongoing      Comments: Care plan reviewed with patient. Patient verbalized understanding of the plan of care and contribute to goal setting.

## 2018-03-11 NOTE — PLAN OF CARE
Problem: Falls - Risk of  Goal: Absence of falls  Outcome: Ongoing  Hourly rounding continues, family at bedside. Call light within reach. Bed and chair alarm activated for safety. Problem: Discharge Planning:  Goal: Discharged to appropriate level of care  Discharged to appropriate level of care   Outcome: Ongoing  Social work consult to discuss discharge needs. Problem: Cardiac Output - Decreased:  Goal: Hemodynamic stability will improve  Hemodynamic stability will improve   Outcome: Ongoing  Vitals stable, will continue to assess. Problem: Tissue Perfusion - Cardiopulmonary, Altered:  Goal: Absence of angina  Absence of angina   Outcome: Ongoing  Patient denies chest pain, will continue to assess. Goal: Hemodynamic stability will improve  Hemodynamic stability will improve   Outcome: Ongoing  Vitals stable, will continue to assess. Comments: Care plan reviewed with patient and family. Patient and family verbalize understanding of the plan of care and contribute to goal setting.

## 2018-03-12 PROBLEM — E44.0 MODERATE MALNUTRITION (HCC): Status: ACTIVE | Noted: 2018-01-01

## 2018-03-12 NOTE — PROGRESS NOTES
Cardiology Progress Note      Patient:  Christofer Daugherty  YOB: 1927  MRN: [id number]   Acct: [de-identified]  Admit Date:  3/9/2018  Primary Cardiologist: none  Seen by dr bruno    Chief Complaint: poor appetite/dizzy/sob  hpi per dr bruno \"80 y.o. pleasant female who presented to the hospital with complaints of shortness of breath. History is primarily provided by the two daughters who are present in the room. Apparently patient had some dizziness, shortness of breath over the course of last one week. They visited their PCP. Daughter also state that patient has not been eating well as of late. Of note patient was diagnosed with flu [place]. \"    Subjective (Events in last 24 hours): pt awake and alert. NAD.  +RODRIGUEZ. No cp.   Pt sob with laying flat and pt still unsure if she wants cath or not      Objective:   /78   Pulse 98   Temp 97.4 °F (36.3 °C) (Oral)   Resp 26   Ht 5' (1.524 m)   Wt 116 lb (52.6 kg)   SpO2 100%   BMI 22.65 kg/m²        TELEMETRY: afib cvr    Physical Exam:  General Appearance: alert and oriented to person, place and time, in no acute distress  Cardiovascular: irregularly irregular  Pulmonary/Chest: clear to auscultation bilaterally- no wheezes, rales or rhonchi, normal air movement, no respiratory distress  Abdomen: soft, non-tender, non-distended, normal bowel sounds, no masses Extremities: no cyanosis, clubbing or edema, pulse   Skin: warm and dry, no rash or erythema  Head: normocephalic and atraumatic  Eyes: pupils equal, round, and reactive to light  Neck: supple and non-tender without mass, no thyromegaly   Musculoskeletal: normal range of motion, no joint swelling, deformity or tenderness  Neurological: alert, oriented, normal speech, no focal findings or movement disorder noted    Medications:    furosemide  20 mg Oral Daily    lisinopril  5 mg Oral Daily    insulin lispro  0-12 Units Subcutaneous TID WC    insulin lispro  0-6 Units Subcutaneous Nightly    digoxin  125 mcg Oral Daily    docusate sodium  100 mg Oral BID    memantine  5 mg Oral BID    metoprolol succinate  50 mg Oral Daily    pantoprazole  40 mg Oral QAM AC    aspirin  81 mg Oral Daily      dextrose         glucose 15 g PRN   dextrose 12.5 g PRN   glucagon (rDNA) 1 mg PRN   dextrose 100 mL/hr PRN   acetaminophen 650 mg Q4H PRN   magnesium hydroxide 30 mL Daily PRN   polyethylene glycol 17 g Daily PRN   ipratropium-albuterol 1 ampule Q4H PRN       Diagnostics:  Echo 3/10/18  Summary   Left ventricle size is normal.   There was severe global hypokinesis of the left ventricle.   Ejection fraction is visually estimated at 15-20%.   Severely dilated left atrium.   Moderately enlarged right atrium size.   The aortic valve appears to be trileaflet with good leaflet separation.   Thickened aortic valve leaflets noted.   Mild to moderae aortic regurgitation is noted.   Mitral annular calcification is present.   Moderate to severe mitral regurgitation is present.   Tricuspid valve is structurally normal.   Moderate tricuspid regurgitation.      Signature      ----------------------------------------------------------------   Electronically signed by Liliam Yo MD (Interpreting   physician) on 03/10/2018 at 03:19 PM    Lab Data:    Cardiac Enzymes:  Recent Labs      03/09/18   1423  03/10/18   0215   CKTOTAL  121  152*       CBC:   Lab Results   Component Value Date    WBC 9.9 03/10/2018    RBC 4.26 03/10/2018    HGB 12.5 03/10/2018    HCT 39.0 03/10/2018     03/10/2018       CMP:    Lab Results   Component Value Date     03/12/2018    K 3.6 03/12/2018    CL 93 03/12/2018    CO2 26 03/12/2018    BUN 44 03/12/2018    CREATININE 0.7 03/12/2018    LABGLOM 79 03/12/2018    GLUCOSE 143 03/12/2018    CALCIUM 8.7 03/12/2018       Hepatic Function Panel:    Lab Results   Component Value Date    ALKPHOS 80 03/10/2018    ALT 25 03/10/2018    AST 30 03/10/2018    PROT 5.8 03/10/2018

## 2018-03-12 NOTE — PLAN OF CARE
Problem: Falls - Risk of  Goal: Absence of falls  Outcome: Ongoing  Fall risk assessment completed and interventions in place. Bed alarm on, side rails up x2, and call light within reach. Wrist band on and falling star posted. Will continue to monitor. Problem: Discharge Planning:  Goal: Discharged to appropriate level of care  Discharged to appropriate level of care   Outcome: Ongoing  Patient admitted from home alone and plans to return at time of discharge. Problem: Cardiac Output - Decreased:  Goal: Hemodynamic stability will improve  Hemodynamic stability will improve   Outcome: Ongoing  Patient hemodynamically stable. Will continue to monitor. Problem: Tissue Perfusion - Cardiopulmonary, Altered:  Goal: Absence of angina  Absence of angina   Outcome: Ongoing  Patient denies any chest pain or discomfort. Goal: Hemodynamic stability will improve  Hemodynamic stability will improve   Outcome: Ongoing  Patient hemodynamically stable. Will continue to monitor.

## 2018-03-13 NOTE — CARE COORDINATION
DISCHARGE BARRIERS  3/13/18, 2:44 PM    Reason for Referral: Family wants [facility] at discharge. Mental Status: Answered questions appropriately. Both she and her son Kadeem Peres were in the room and it was somewhat difficult to get them to answer questions at times. Decision Making: She and her son Kadeem Peres were making decisions together. Family/Social/Home Environment: Lives at home with her son Kadeem Peres. He does not work. She has private duty aids coming in 6-7 hours a day. Current Services: Private Duty aids 6-7 hours a day. Current Equipment:2 wheeled walker, shower chair,   Payment Source:Medicare and [employer]. Concerns or Barriers to Discharge: None  Collabrative List of ECF/HH were provided:NA- Came in asking for [facility]    Teach Back Method used with Julio Peres regarding care plan and discharge planning. Patient and son Kadeem Peres verbalized understanding of the plan of care and contribute to goal setting. Anticipated Needs/Discharge Plan: Return home with son, private duty aids and new [facility]. Electronically signed by GREG Ortiz on 3/13/2018 at 2:44 PM

## 2018-03-13 NOTE — PROGRESS NOTES
Inpatient Cardiac Rehabilitation Consult    Received consult for Phase II Cardiac Rehabilitation. Patient is not having a cath so she does not meet qualifications for cardiac rehabilitation.

## 2018-03-13 NOTE — PROGRESS NOTES
Units Subcutaneous TID     insulin lispro  0-6 Units Subcutaneous Nightly    digoxin  125 mcg Oral Daily    docusate sodium  100 mg Oral BID    memantine  5 mg Oral BID    metoprolol succinate  50 mg Oral Daily    pantoprazole  40 mg Oral QAM AC    aspirin  81 mg Oral Daily      amiodarone 1 mg/min (03/13/18 1426)    Followed by   Jose Denise amiodarone      dextrose         glucose 15 g PRN   dextrose 12.5 g PRN   glucagon (rDNA) 1 mg PRN   dextrose 100 mL/hr PRN   acetaminophen 650 mg Q4H PRN   magnesium hydroxide 30 mL Daily PRN   polyethylene glycol 17 g Daily PRN   ipratropium-albuterol 1 ampule Q4H PRN       Diagnostics:  Echo 3/10/18  Summary   Left ventricle size is normal.   There was severe global hypokinesis of the left ventricle.   Ejection fraction is visually estimated at 15-20%.   Severely dilated left atrium.   Moderately enlarged right atrium size.   The aortic valve appears to be trileaflet with good leaflet separation.   Thickened aortic valve leaflets noted.   Mild to moderae aortic regurgitation is noted.   Mitral annular calcification is present.   Moderate to severe mitral regurgitation is present.   Tricuspid valve is structurally normal.   Moderate tricuspid regurgitation.      Signature      ----------------------------------------------------------------   Electronically signed by Jose Rondon MD (Interpreting   physician) on 03/10/2018 at 03:19 PM    Lab Data:    Cardiac Enzymes:  No results for input(s): CKTOTAL, CKMB, CKMBINDEX, TROPONINI in the last 72 hours.     CBC:   Lab Results   Component Value Date    WBC 9.9 03/10/2018    RBC 4.26 03/10/2018    HGB 12.5 03/10/2018    HCT 39.0 03/10/2018     03/10/2018       CMP:    Lab Results   Component Value Date     03/13/2018    K 3.8 03/13/2018    CL 96 03/13/2018    CO2 24 03/13/2018    BUN 56 03/13/2018    CREATININE 0.8 03/13/2018    LABGLOM 67 03/13/2018    GLUCOSE 181 03/13/2018    CALCIUM 8.9 03/13/2018       Hepatic Function Panel:    Lab Results   Component Value Date    ALKPHOS 80 03/10/2018    ALT 25 03/10/2018    AST 30 03/10/2018    PROT 5.8 03/10/2018    BILITOT 1.1 03/10/2018    BILIDIR 0.5 03/09/2018    LABALBU 3.1 03/10/2018       Magnesium:    Lab Results   Component Value Date    MG 2.3 03/13/2018       PT/INR:    Lab Results   Component Value Date    INR 2.42 03/13/2018       HgBA1c:  No results found for: LABA1C    FLP:    Lab Results   Component Value Date    TRIG 162 03/10/2018    HDL 19 03/10/2018    LDLCALC 143 03/10/2018       TSH:    Lab Results   Component Value Date    TSH 2.810 03/09/2018         Assessment:    NSTEMI  Hx CAD with PCI long ago - ?8977U  Acute systolic CHF  Ef 68-39 per echo 3/11/18, decreased from ef 50-55 per echo 7/6/15  Mod-severe MR  Chronic afib cvr - episodes of RVR with BBB  Possible NSVT  supratherapeutic INR    Discussed with dr Maureen Ravi:  · Cont asa/bb/ace/dig/statin  · Start amio bolus and gtt  · Cont diuresis with lasix  · Daily I/o and weights  · 2 liter fluid restriction and 2gm sodium diet  · Npo after midnight for possible cath tomorrow     Dr Jaqui Arce and I both discussed with patient and one daughter at bedside  Pt doesn't fully understand the cath procedure. It was explained multiple times and she repeats \"I dont want surgery\" and \"what do you think I should do? \"  The risks of cath and risk of no cath explained to patient and daughter at bedside. At this time daughter not fully interested however, dilemma is there are multiple POAs and not all available to discuss with, and the POA's (siblings) do not get along and dont communicate together. Will consult palliative care to help determine code status and pt state she doesn't want intubated. Hopefully family can decide cath or no cath in next 24 hours.   In meantime, will optimize medical therapy      Electronically signed by Dipak Mott PA-C on 3/13/2018 at 3:32 PM

## 2018-03-14 NOTE — PROGRESS NOTES
93*  96*  92*   CO2  26  24  21*   BUN  44*  56*  70*   CREATININE  0.7  0.8  1.2   GLUCOSE  143*  181*  112*     Hepatic:   No results for input(s): AST, ALT, ALB, BILITOT, ALKPHOS in the last 72 hours. Troponin: No results for input(s): TROPONINI in the last 72 hours. BNP: No results for input(s): BNP in the last 72 hours. Lipids:   No results for input(s): CHOL, HDL in the last 72 hours. Invalid input(s): LDLCALCU  INR:   Recent Labs      03/12/18   1747  03/13/18   0407  03/14/18   0531   INR  2.41*  2.42*  1.55*       Radiology    Objective:   Vitals: /61   Pulse 73   Temp 97.4 °F (36.3 °C) (Oral)   Resp 16   Ht 5' (1.524 m)   Wt 116 lb (52.6 kg)   SpO2 100%   BMI 22.65 kg/m²   HEENT: Head:pupils react  Neck: supple  Lungs: decreased air entry bilat   Heart: regular rate and rhythm   Abdomen: soft BS heard NG NT  Extremities: warm    Neurologic:  Awake to name    Impression:   :   1.  AFib with RVR rate controlled  2. Elevated trop  with h/o CAD and stents  3. Shortness of breath secondary to AFib with CHF  4. Supratherapeutic INR. Cathy Elijah 5.  History of hypertension. 6.  History of dementia. 7.  wt loss  8. Aortic regurgitation.   9.  Acute CHF sec to systolic dysfunction  10 DM2    Plan:    Inform cardio of medical treatment  Resume Coumadin  Cont ASA B [place] ACEI statin  Family felt metformin causing poor appetite and will try trajenta for her glucose  SS to help with discharge needs      Ibeth Aceves MD

## 2018-04-11 PROBLEM — R77.8 ELEVATED TROPONIN: Status: RESOLVED | Noted: 2018-01-01 | Resolved: 2018-04-11
